# Patient Record
Sex: MALE | Race: OTHER | Employment: FULL TIME | URBAN - METROPOLITAN AREA
[De-identification: names, ages, dates, MRNs, and addresses within clinical notes are randomized per-mention and may not be internally consistent; named-entity substitution may affect disease eponyms.]

---

## 2022-10-24 ENCOUNTER — LAB ENCOUNTER (OUTPATIENT)
Dept: LAB | Facility: HOSPITAL | Age: 52
End: 2022-10-24
Attending: ORTHOPAEDIC SURGERY
Payer: OTHER MISCELLANEOUS

## 2022-10-24 DIAGNOSIS — Z01.818 PREOPERATIVE TESTING: ICD-10-CM

## 2022-10-24 LAB
ANION GAP SERPL CALC-SCNC: 3 MMOL/L (ref 0–18)
ANTIBODY SCREEN: NEGATIVE
BASOPHILS # BLD AUTO: 0.06 X10(3) UL (ref 0–0.2)
BASOPHILS NFR BLD AUTO: 1.1 %
BUN BLD-MCNC: 12 MG/DL (ref 7–18)
BUN/CREAT SERPL: 14.3 (ref 10–20)
CALCIUM BLD-MCNC: 9.1 MG/DL (ref 8.5–10.1)
CHLORIDE SERPL-SCNC: 108 MMOL/L (ref 98–112)
CO2 SERPL-SCNC: 27 MMOL/L (ref 21–32)
CREAT BLD-MCNC: 0.84 MG/DL
DEPRECATED RDW RBC AUTO: 40.9 FL (ref 35.1–46.3)
EOSINOPHIL # BLD AUTO: 0.09 X10(3) UL (ref 0–0.7)
EOSINOPHIL NFR BLD AUTO: 1.6 %
ERYTHROCYTE [DISTWIDTH] IN BLOOD BY AUTOMATED COUNT: 12.4 % (ref 11–15)
GFR SERPLBLD BASED ON 1.73 SQ M-ARVRAT: 105 ML/MIN/1.73M2 (ref 60–?)
GLUCOSE BLD-MCNC: 95 MG/DL (ref 70–99)
HCT VFR BLD AUTO: 43.6 %
HGB BLD-MCNC: 14.6 G/DL
IMM GRANULOCYTES # BLD AUTO: 0.01 X10(3) UL (ref 0–1)
IMM GRANULOCYTES NFR BLD: 0.2 %
LYMPHOCYTES # BLD AUTO: 2.04 X10(3) UL (ref 1–4)
LYMPHOCYTES NFR BLD AUTO: 37.1 %
MCH RBC QN AUTO: 30.1 PG (ref 26–34)
MCHC RBC AUTO-ENTMCNC: 33.5 G/DL (ref 31–37)
MCV RBC AUTO: 89.9 FL
MONOCYTES # BLD AUTO: 0.46 X10(3) UL (ref 0.1–1)
MONOCYTES NFR BLD AUTO: 8.4 %
NEUTROPHILS # BLD AUTO: 2.84 X10 (3) UL (ref 1.5–7.7)
NEUTROPHILS # BLD AUTO: 2.84 X10(3) UL (ref 1.5–7.7)
NEUTROPHILS NFR BLD AUTO: 51.6 %
OSMOLALITY SERPL CALC.SUM OF ELEC: 286 MOSM/KG (ref 275–295)
PLATELET # BLD AUTO: 177 10(3)UL (ref 150–450)
POTASSIUM SERPL-SCNC: 4.3 MMOL/L (ref 3.5–5.1)
RBC # BLD AUTO: 4.85 X10(6)UL
RH BLOOD TYPE: POSITIVE
RH BLOOD TYPE: POSITIVE
SARS-COV-2 RNA RESP QL NAA+PROBE: NOT DETECTED
SODIUM SERPL-SCNC: 138 MMOL/L (ref 136–145)
WBC # BLD AUTO: 5.5 X10(3) UL (ref 4–11)

## 2022-10-24 PROCEDURE — 86900 BLOOD TYPING SEROLOGIC ABO: CPT

## 2022-10-24 PROCEDURE — 86850 RBC ANTIBODY SCREEN: CPT

## 2022-10-24 PROCEDURE — 85025 COMPLETE CBC W/AUTO DIFF WBC: CPT

## 2022-10-24 PROCEDURE — 87641 MR-STAPH DNA AMP PROBE: CPT

## 2022-10-24 PROCEDURE — 36415 COLL VENOUS BLD VENIPUNCTURE: CPT

## 2022-10-24 PROCEDURE — 80048 BASIC METABOLIC PNL TOTAL CA: CPT

## 2022-10-24 PROCEDURE — 86901 BLOOD TYPING SEROLOGIC RH(D): CPT

## 2022-10-25 LAB — MRSA DNA SPEC QL NAA+PROBE: NEGATIVE

## 2022-10-25 RX ORDER — IBUPROFEN 600 MG/1
600 TABLET ORAL EVERY 6 HOURS PRN
COMMUNITY
End: 2022-10-28

## 2022-10-27 ENCOUNTER — HOSPITAL ENCOUNTER (INPATIENT)
Facility: HOSPITAL | Age: 52
LOS: 1 days | Discharge: HOME | End: 2022-10-28
Attending: ORTHOPAEDIC SURGERY | Admitting: ORTHOPAEDIC SURGERY
Payer: OTHER MISCELLANEOUS

## 2022-10-27 ENCOUNTER — ANESTHESIA (OUTPATIENT)
Dept: SURGERY | Facility: HOSPITAL | Age: 52
End: 2022-10-27
Payer: OTHER MISCELLANEOUS

## 2022-10-27 ENCOUNTER — APPOINTMENT (OUTPATIENT)
Dept: GENERAL RADIOLOGY | Facility: HOSPITAL | Age: 52
End: 2022-10-27
Attending: ORTHOPAEDIC SURGERY
Payer: OTHER MISCELLANEOUS

## 2022-10-27 ENCOUNTER — ANESTHESIA EVENT (OUTPATIENT)
Dept: SURGERY | Facility: HOSPITAL | Age: 52
End: 2022-10-27
Payer: OTHER MISCELLANEOUS

## 2022-10-27 DIAGNOSIS — Z01.818 PREOPERATIVE TESTING: Primary | ICD-10-CM

## 2022-10-27 PROCEDURE — 99232 SBSQ HOSP IP/OBS MODERATE 35: CPT | Performed by: HOSPITALIST

## 2022-10-27 PROCEDURE — 76000 FLUOROSCOPY <1 HR PHYS/QHP: CPT | Performed by: ORTHOPAEDIC SURGERY

## 2022-10-27 PROCEDURE — 0SG0071 FUSION OF LUMBAR VERTEBRAL JOINT WITH AUTOLOGOUS TISSUE SUBSTITUTE, POSTERIOR APPROACH, POSTERIOR COLUMN, OPEN APPROACH: ICD-10-PCS | Performed by: ORTHOPAEDIC SURGERY

## 2022-10-27 PROCEDURE — 01NB0ZZ RELEASE LUMBAR NERVE, OPEN APPROACH: ICD-10-PCS | Performed by: ORTHOPAEDIC SURGERY

## 2022-10-27 PROCEDURE — 0SG00AJ FUSION OF LUMBAR VERTEBRAL JOINT WITH INTERBODY FUSION DEVICE, POSTERIOR APPROACH, ANTERIOR COLUMN, OPEN APPROACH: ICD-10-PCS | Performed by: ORTHOPAEDIC SURGERY

## 2022-10-27 PROCEDURE — 4A11X4G MONITORING OF PERIPHERAL NERVOUS ELECTRICAL ACTIVITY, INTRAOPERATIVE, EXTERNAL APPROACH: ICD-10-PCS | Performed by: ORTHOPAEDIC SURGERY

## 2022-10-27 PROCEDURE — 0SB20ZZ EXCISION OF LUMBAR VERTEBRAL DISC, OPEN APPROACH: ICD-10-PCS | Performed by: ORTHOPAEDIC SURGERY

## 2022-10-27 PROCEDURE — 0Q800ZZ DIVISION OF LUMBAR VERTEBRA, OPEN APPROACH: ICD-10-PCS | Performed by: ORTHOPAEDIC SURGERY

## 2022-10-27 DEVICE — ROD, PRE-BENT
Type: IMPLANTABLE DEVICE | Site: BACK | Status: FUNCTIONAL
Brand: STREAMLINE MIS SPINAL FIXATION SYSTEM

## 2022-10-27 DEVICE — SET SCREW
Type: IMPLANTABLE DEVICE | Site: BACK | Status: FUNCTIONAL
Brand: STREAMLINE TL SPINAL FIXATION SYSTEM

## 2022-10-27 DEVICE — GRAFT BONE PROPEL LARGE PUTTY: Type: IMPLANTABLE DEVICE | Site: BACK | Status: FUNCTIONAL

## 2022-10-27 RX ORDER — ROCURONIUM BROMIDE 10 MG/ML
INJECTION, SOLUTION INTRAVENOUS AS NEEDED
Status: DISCONTINUED | OUTPATIENT
Start: 2022-10-27 | End: 2022-10-27 | Stop reason: SURG

## 2022-10-27 RX ORDER — HYDROMORPHONE HYDROCHLORIDE 1 MG/ML
0.4 INJECTION, SOLUTION INTRAMUSCULAR; INTRAVENOUS; SUBCUTANEOUS EVERY 2 HOUR PRN
Status: DISCONTINUED | OUTPATIENT
Start: 2022-10-27 | End: 2022-10-28

## 2022-10-27 RX ORDER — DIPHENHYDRAMINE HCL 25 MG
25 CAPSULE ORAL EVERY 4 HOURS PRN
Status: DISCONTINUED | OUTPATIENT
Start: 2022-10-27 | End: 2022-10-28

## 2022-10-27 RX ORDER — SODIUM CHLORIDE, SODIUM LACTATE, POTASSIUM CHLORIDE, CALCIUM CHLORIDE 600; 310; 30; 20 MG/100ML; MG/100ML; MG/100ML; MG/100ML
INJECTION, SOLUTION INTRAVENOUS CONTINUOUS
Status: DISCONTINUED | OUTPATIENT
Start: 2022-10-27 | End: 2022-10-27 | Stop reason: HOSPADM

## 2022-10-27 RX ORDER — DEXAMETHASONE SODIUM PHOSPHATE 4 MG/ML
VIAL (ML) INJECTION AS NEEDED
Status: DISCONTINUED | OUTPATIENT
Start: 2022-10-27 | End: 2022-10-27 | Stop reason: SURG

## 2022-10-27 RX ORDER — ONDANSETRON 2 MG/ML
INJECTION INTRAMUSCULAR; INTRAVENOUS AS NEEDED
Status: DISCONTINUED | OUTPATIENT
Start: 2022-10-27 | End: 2022-10-27 | Stop reason: SURG

## 2022-10-27 RX ORDER — ONDANSETRON 2 MG/ML
4 INJECTION INTRAMUSCULAR; INTRAVENOUS EVERY 6 HOURS PRN
Status: DISCONTINUED | OUTPATIENT
Start: 2022-10-27 | End: 2022-10-27 | Stop reason: HOSPADM

## 2022-10-27 RX ORDER — EPHEDRINE SULFATE 50 MG/ML
INJECTION, SOLUTION INTRAVENOUS AS NEEDED
Status: DISCONTINUED | OUTPATIENT
Start: 2022-10-27 | End: 2022-10-27 | Stop reason: SURG

## 2022-10-27 RX ORDER — HYDROMORPHONE HYDROCHLORIDE 1 MG/ML
0.6 INJECTION, SOLUTION INTRAMUSCULAR; INTRAVENOUS; SUBCUTANEOUS EVERY 5 MIN PRN
Status: DISCONTINUED | OUTPATIENT
Start: 2022-10-27 | End: 2022-10-27 | Stop reason: HOSPADM

## 2022-10-27 RX ORDER — OXYCODONE HYDROCHLORIDE 5 MG/1
5 TABLET ORAL EVERY 4 HOURS PRN
Status: DISCONTINUED | OUTPATIENT
Start: 2022-10-27 | End: 2022-10-28

## 2022-10-27 RX ORDER — FAMOTIDINE 20 MG/1
20 TABLET, FILM COATED ORAL ONCE
Status: COMPLETED | OUTPATIENT
Start: 2022-10-27 | End: 2022-10-27

## 2022-10-27 RX ORDER — CYCLOBENZAPRINE HCL 5 MG
5 TABLET ORAL EVERY 6 HOURS PRN
Status: DISCONTINUED | OUTPATIENT
Start: 2022-10-27 | End: 2022-10-28

## 2022-10-27 RX ORDER — BISACODYL 10 MG
10 SUPPOSITORY, RECTAL RECTAL
Status: DISCONTINUED | OUTPATIENT
Start: 2022-10-27 | End: 2022-10-28

## 2022-10-27 RX ORDER — MORPHINE SULFATE 4 MG/ML
4 INJECTION, SOLUTION INTRAMUSCULAR; INTRAVENOUS EVERY 10 MIN PRN
Status: DISCONTINUED | OUTPATIENT
Start: 2022-10-27 | End: 2022-10-27 | Stop reason: HOSPADM

## 2022-10-27 RX ORDER — HYDROMORPHONE HYDROCHLORIDE 1 MG/ML
0.8 INJECTION, SOLUTION INTRAMUSCULAR; INTRAVENOUS; SUBCUTANEOUS EVERY 2 HOUR PRN
Status: DISCONTINUED | OUTPATIENT
Start: 2022-10-27 | End: 2022-10-28

## 2022-10-27 RX ORDER — CEFAZOLIN SODIUM/WATER 2 G/20 ML
2 SYRINGE (ML) INTRAVENOUS ONCE
Status: COMPLETED | OUTPATIENT
Start: 2022-10-27 | End: 2022-10-27

## 2022-10-27 RX ORDER — ACETAMINOPHEN 500 MG
1000 TABLET ORAL ONCE
Status: COMPLETED | OUTPATIENT
Start: 2022-10-27 | End: 2022-10-27

## 2022-10-27 RX ORDER — PROCHLORPERAZINE EDISYLATE 5 MG/ML
5 INJECTION INTRAMUSCULAR; INTRAVENOUS EVERY 8 HOURS PRN
Status: DISCONTINUED | OUTPATIENT
Start: 2022-10-27 | End: 2022-10-27 | Stop reason: HOSPADM

## 2022-10-27 RX ORDER — DIAZEPAM 5 MG/1
5 TABLET ORAL EVERY 6 HOURS PRN
Status: DISCONTINUED | OUTPATIENT
Start: 2022-10-27 | End: 2022-10-28

## 2022-10-27 RX ORDER — POLYETHYLENE GLYCOL 3350 17 G/17G
17 POWDER, FOR SOLUTION ORAL DAILY PRN
Status: DISCONTINUED | OUTPATIENT
Start: 2022-10-27 | End: 2022-10-28

## 2022-10-27 RX ORDER — MORPHINE SULFATE 10 MG/ML
6 INJECTION, SOLUTION INTRAMUSCULAR; INTRAVENOUS EVERY 10 MIN PRN
Status: DISCONTINUED | OUTPATIENT
Start: 2022-10-27 | End: 2022-10-27 | Stop reason: HOSPADM

## 2022-10-27 RX ORDER — SODIUM CHLORIDE 9 MG/ML
INJECTION, SOLUTION INTRAVENOUS CONTINUOUS PRN
Status: DISCONTINUED | OUTPATIENT
Start: 2022-10-27 | End: 2022-10-27 | Stop reason: SURG

## 2022-10-27 RX ORDER — SODIUM PHOSPHATE, DIBASIC AND SODIUM PHOSPHATE, MONOBASIC 7; 19 G/133ML; G/133ML
1 ENEMA RECTAL ONCE AS NEEDED
Status: DISCONTINUED | OUTPATIENT
Start: 2022-10-27 | End: 2022-10-28

## 2022-10-27 RX ORDER — SENNOSIDES 8.6 MG
17.2 TABLET ORAL NIGHTLY
Status: DISCONTINUED | OUTPATIENT
Start: 2022-10-27 | End: 2022-10-28

## 2022-10-27 RX ORDER — MORPHINE SULFATE 4 MG/ML
2 INJECTION, SOLUTION INTRAMUSCULAR; INTRAVENOUS EVERY 10 MIN PRN
Status: DISCONTINUED | OUTPATIENT
Start: 2022-10-27 | End: 2022-10-27 | Stop reason: HOSPADM

## 2022-10-27 RX ORDER — DOCUSATE SODIUM 100 MG/1
100 CAPSULE, LIQUID FILLED ORAL 2 TIMES DAILY
Status: DISCONTINUED | OUTPATIENT
Start: 2022-10-27 | End: 2022-10-28

## 2022-10-27 RX ORDER — ONDANSETRON 2 MG/ML
4 INJECTION INTRAMUSCULAR; INTRAVENOUS EVERY 6 HOURS PRN
Status: DISCONTINUED | OUTPATIENT
Start: 2022-10-27 | End: 2022-10-28

## 2022-10-27 RX ORDER — MIDAZOLAM HYDROCHLORIDE 1 MG/ML
INJECTION INTRAMUSCULAR; INTRAVENOUS AS NEEDED
Status: DISCONTINUED | OUTPATIENT
Start: 2022-10-27 | End: 2022-10-27 | Stop reason: SURG

## 2022-10-27 RX ORDER — PROCHLORPERAZINE EDISYLATE 5 MG/ML
5 INJECTION INTRAMUSCULAR; INTRAVENOUS EVERY 8 HOURS PRN
Status: DISCONTINUED | OUTPATIENT
Start: 2022-10-27 | End: 2022-10-28

## 2022-10-27 RX ORDER — METOCLOPRAMIDE 10 MG/1
10 TABLET ORAL ONCE
Status: COMPLETED | OUTPATIENT
Start: 2022-10-27 | End: 2022-10-27

## 2022-10-27 RX ORDER — HYDROMORPHONE HYDROCHLORIDE 1 MG/ML
0.2 INJECTION, SOLUTION INTRAMUSCULAR; INTRAVENOUS; SUBCUTANEOUS EVERY 5 MIN PRN
Status: DISCONTINUED | OUTPATIENT
Start: 2022-10-27 | End: 2022-10-27 | Stop reason: HOSPADM

## 2022-10-27 RX ORDER — DIPHENHYDRAMINE HYDROCHLORIDE 50 MG/ML
25 INJECTION INTRAMUSCULAR; INTRAVENOUS EVERY 4 HOURS PRN
Status: DISCONTINUED | OUTPATIENT
Start: 2022-10-27 | End: 2022-10-28

## 2022-10-27 RX ORDER — SODIUM CHLORIDE, SODIUM LACTATE, POTASSIUM CHLORIDE, CALCIUM CHLORIDE 600; 310; 30; 20 MG/100ML; MG/100ML; MG/100ML; MG/100ML
INJECTION, SOLUTION INTRAVENOUS CONTINUOUS
Status: DISCONTINUED | OUTPATIENT
Start: 2022-10-27 | End: 2022-10-28

## 2022-10-27 RX ORDER — LIDOCAINE HYDROCHLORIDE 10 MG/ML
INJECTION, SOLUTION EPIDURAL; INFILTRATION; INTRACAUDAL; PERINEURAL AS NEEDED
Status: DISCONTINUED | OUTPATIENT
Start: 2022-10-27 | End: 2022-10-27 | Stop reason: SURG

## 2022-10-27 RX ORDER — OXYCODONE HYDROCHLORIDE 5 MG/1
10 TABLET ORAL EVERY 4 HOURS PRN
Status: DISCONTINUED | OUTPATIENT
Start: 2022-10-27 | End: 2022-10-28

## 2022-10-27 RX ORDER — NALOXONE HYDROCHLORIDE 0.4 MG/ML
80 INJECTION, SOLUTION INTRAMUSCULAR; INTRAVENOUS; SUBCUTANEOUS AS NEEDED
Status: DISCONTINUED | OUTPATIENT
Start: 2022-10-27 | End: 2022-10-27 | Stop reason: HOSPADM

## 2022-10-27 RX ORDER — VANCOMYCIN HYDROCHLORIDE 500 MG/10ML
INJECTION, POWDER, LYOPHILIZED, FOR SOLUTION INTRAVENOUS CONTINUOUS PRN
Status: COMPLETED | OUTPATIENT
Start: 2022-10-27 | End: 2022-10-27

## 2022-10-27 RX ORDER — CEFAZOLIN SODIUM/WATER 2 G/20 ML
2 SYRINGE (ML) INTRAVENOUS EVERY 8 HOURS
Status: COMPLETED | OUTPATIENT
Start: 2022-10-27 | End: 2022-10-28

## 2022-10-27 RX ORDER — ESMOLOL HYDROCHLORIDE 10 MG/ML
INJECTION INTRAVENOUS AS NEEDED
Status: DISCONTINUED | OUTPATIENT
Start: 2022-10-27 | End: 2022-10-27 | Stop reason: SURG

## 2022-10-27 RX ORDER — GLYCOPYRROLATE 0.2 MG/ML
INJECTION, SOLUTION INTRAMUSCULAR; INTRAVENOUS AS NEEDED
Status: DISCONTINUED | OUTPATIENT
Start: 2022-10-27 | End: 2022-10-27 | Stop reason: SURG

## 2022-10-27 RX ORDER — HYDROMORPHONE HYDROCHLORIDE 1 MG/ML
0.4 INJECTION, SOLUTION INTRAMUSCULAR; INTRAVENOUS; SUBCUTANEOUS EVERY 5 MIN PRN
Status: DISCONTINUED | OUTPATIENT
Start: 2022-10-27 | End: 2022-10-27 | Stop reason: HOSPADM

## 2022-10-27 RX ADMIN — DEXAMETHASONE SODIUM PHOSPHATE 4 MG: 4 MG/ML VIAL (ML) INJECTION at 08:16:00

## 2022-10-27 RX ADMIN — LIDOCAINE HYDROCHLORIDE 50 MG: 10 INJECTION, SOLUTION EPIDURAL; INFILTRATION; INTRACAUDAL; PERINEURAL at 07:50:00

## 2022-10-27 RX ADMIN — SODIUM CHLORIDE: 9 INJECTION, SOLUTION INTRAVENOUS at 08:05:00

## 2022-10-27 RX ADMIN — MIDAZOLAM HYDROCHLORIDE 2 MG: 1 INJECTION INTRAMUSCULAR; INTRAVENOUS at 07:39:00

## 2022-10-27 RX ADMIN — EPHEDRINE SULFATE 5 MG: 50 INJECTION, SOLUTION INTRAVENOUS at 07:53:00

## 2022-10-27 RX ADMIN — CEFAZOLIN SODIUM/WATER 2 G: 2 G/20 ML SYRINGE (ML) INTRAVENOUS at 07:55:00

## 2022-10-27 RX ADMIN — SODIUM CHLORIDE, SODIUM LACTATE, POTASSIUM CHLORIDE, CALCIUM CHLORIDE: 600; 310; 30; 20 INJECTION, SOLUTION INTRAVENOUS at 09:47:00

## 2022-10-27 RX ADMIN — ROCURONIUM BROMIDE 10 MG: 10 INJECTION, SOLUTION INTRAVENOUS at 07:50:00

## 2022-10-27 RX ADMIN — SODIUM CHLORIDE, SODIUM LACTATE, POTASSIUM CHLORIDE, CALCIUM CHLORIDE: 600; 310; 30; 20 INJECTION, SOLUTION INTRAVENOUS at 07:39:00

## 2022-10-27 RX ADMIN — SODIUM CHLORIDE: 9 INJECTION, SOLUTION INTRAVENOUS at 09:47:00

## 2022-10-27 RX ADMIN — GLYCOPYRROLATE 0.2 MG: 0.2 INJECTION, SOLUTION INTRAMUSCULAR; INTRAVENOUS at 09:23:00

## 2022-10-27 RX ADMIN — ONDANSETRON 4 MG: 2 INJECTION INTRAMUSCULAR; INTRAVENOUS at 09:30:00

## 2022-10-27 RX ADMIN — ESMOLOL HYDROCHLORIDE 20 MG: 10 INJECTION INTRAVENOUS at 09:57:00

## 2022-10-27 RX ADMIN — EPHEDRINE SULFATE 10 MG: 50 INJECTION, SOLUTION INTRAVENOUS at 09:13:00

## 2022-10-27 NOTE — ANESTHESIA PROCEDURE NOTES
Arterial Line    Date/Time: 10/27/2022 7:46 AM  Performed by: Jayden Bowen CRNA  Authorized by: Jack Garza DO     General Information and Staff    Procedure Start:  10/27/2022 7:46 AM  Procedure End:  10/27/2022 7:48 AM  CRNA:  Jayden Bowen CRNA  Performed By:  CRNA  Patient Location:  OR  Indication: continuous blood pressure monitoring    Site Identification: surface landmarks    Preanesthetic Checklist: 2 patient identifiers, IV checked, risks and benefits discussed, monitors and equipment checked, pre-op evaluation, timeout performed, anesthesia consent and sterile technique used    Procedure Details    Catheter Size:  20 G  Catheter Length:  1 and 3/4 inch  Catheter Type:  Arrow  Seldinger Technique?: Yes    Laterality:  Right  Site:  Radial artery  Site Prep: chlorhexidine    Line Secured:  Tape and Tegaderm    Assessment    Events: patient tolerated procedure well with no complications      Medications  10/27/2022 7:46 AM      Additional Comments    Atraumatic x 1 attempt as noted

## 2022-10-27 NOTE — INTERVAL H&P NOTE
Pre-op Diagnosis: Lumbar spinal stenosis, lumbar degenerative disk disease, lumbar radiculitis, lumbar spine instability    The above referenced H&P was reviewed by Hakan Bright MD on 10/27/2022, the patient was examined and no significant changes have occurred in the patient's condition since the H&P was performed. I discussed with the patient and/or legal representative the potential benefits, risks and side effects of this procedure; the likelihood of the patient achieving goals; and potential problems that might occur during recuperation. I discussed reasonable alternatives to the procedure, including risks, benefits and side effects related to the alternatives and risks related to not receiving this procedure. We will proceed with procedure as planned. Patient understands the risks, complications, and alternatives to procedure. Risks discussed included bleeding, infection, scarring, injury to nerves and blood vessels, CSF leakage, pseudoarthrosis, failure of instrumentation, continued and/or worsening pain in the lumbar spine and/or lower extremities, continued and/or worsening numbness, tingling, weakness in the lower extremities, paraparesis, paraplegia, bowel and bladder dysfunction, sexual dysfunction, adjacent segment disease, vision loss as well as the general risks of anesthetics and surgical procedures in general, namely cardiac, pulmonary, embolic, stroke, as well as the remote possibility of death. Patient understands that no promises and/or guarantees with regards to the performance of any surgical procedure can or has been given. Patient understands that desired results may not be achieved and all of their questions were answered. Pt wishes to pursue accepted procedure.

## 2022-10-27 NOTE — BRIEF OP NOTE
Pre-Operative Diagnosis: Lumbar spinal stenosis, lumbar degenerative disk disease, lumbar radiculitis, lumbar spine instability     Post-Operative Diagnosis: Lumbar spinal stenosis, lumbar degenerative disk disease, lumbar radiculitis, lumbar spine instability      Procedure Performed:   L4-5 posterior laminectomy  spinal fusion, transforaminal lumbar interbody fusion, instrumentation, allograft, autograft    Surgeon(s) and Role:     David Young MD - Primary     * Estrella Medrano MD - Assisting Surgeon    Assistant(s):  Surgical Assistant.: Eleazar Welsh     Surgical Findings: spondylolisthesis, stenosis     Specimen: disk     Estimated Blood Loss: Blood Output: 50 mL (10/27/2022  9:39 AM)  IVF 800cc    SSEP/EMG without changes during decompression/fusion.    All screws >10mA      Dictation Number:    Hakan Bright MD  10/27/2022  9:50 AM

## 2022-10-27 NOTE — OPERATIVE REPORT
[facility]    PATIENT'S NAME: Jossue Perrin   ATTENDING PHYSICIAN: Jatin Jhaveri MD   OPERATING PHYSICIAN: Jatin Jhaveri MD   PATIENT ACCOUNT#:   [de-identified]    LOCATION:  [address] [place] [address] [place] #:   [id number]       YOB: 1970  ADMISSION DATE:       10/27/2022      OPERATION DATE:  10/27/2022    OPERATIVE REPORT    PREOPERATIVE DIAGNOSIS:  L4-5 spondylolisthesis, lateral recess stenosis, radiculopathy. POSTOPERATIVE DIAGNOSIS:  L4-5 spondylolisthesis, lateral recess stenosis, radiculopathy. PROCEDURE:    1. Decompressive laminectomy for lateral recess stenosis, L4-5.  2.   Boggs-Gonzalez osteotomy, L4-5, removal of facet. 3.   Posterior spinal fusion, intertransverse process arthrodesis L4-5.  4.   Transforaminal lumbar interbody fusion, L4-5.  5. Instrumentation for posterior spinal fusion, intertransverse process arthrodesis, L4-5 using Surgalign MIS percutaneous pedicle screws and rods. 6.   Instrumentation for transforaminal lumbar interbody fusion, L4-5 using Surgalign interbody cage. 7.   Aspiration of bone marrow, L4 and L5 vertebral bodies. 8.   Usage of morselized autograft through same fascial incision. 9.   Usage of demineralized bone matrix allograft putty. 10.   Intraoperative usage of C-arm fluoroscopy image intensification. 11.   Intraoperative usage of SSEP/EMG monitoring with pedicle screw stimulation. CO-SURGEONS:  Jatin Jhaveri M.D. and Richie Guerra M.D.     [facility]:  Maribel Jameson [facility]. ANESTHESIA:  General anesthesia. ESTIMATED BLOOD LOSS:  50 mL. INTRAVENOUS FLUIDS:  800 mL of crystalloid. SPECIMENS:  L4-5 disc. COMPLICATIONS:  None. SSEP/EMG is without changes throughout the entirety of decompression and fusion with instrumentation. All pedicle screws stimulated with all results greater than 10 milliamps.     INDICATIONS:  The patient is a 69-year-old male who sustained a work-related injury to his lumbar spine on 01/24/2022. He had radiculopathy primarily down the right leg, but also some in his left buttock and thigh. He was worked up with an MRI scan that showed spondylolisthesis and lateral recess stenosis. He did undergo extensive conservative management including medications, physical therapy, injections. He did elect for not only decompression but also stabilization with instrumentation due to his spondylolisthesis. He did understand the risks, complications, and alternatives to the procedure. Risks discussed included bleeding, infection, scarring, injury to nerves and blood vessels, discitis, CSF leakage, pseudoarthrosis, failure of instrumentation, continued and/or worsening pain in the lumbar spine and/or lower extremities, continued and/or worsening numbness, weakness in the extremities, paraparesis, paraplegia, bowel and bladder dysfunction, sexual dysfunction, vision loss, as well as the general risks of anesthetics and surgical procedures in general, namely cardiac, pulmonary, embolic, stroke, ileus, as well as remote possibility of death. He understood that no promises and/or guarantees with regard to the performance of any surgical procedure can or has been given. He understands that desired results may not be achieved, and all of his questions were answered. He was deemed medically stable by the primary care physician as well as the cardiology team prior to surgery. OPERATIVE TECHNIQUE:  The patient was brought to the operating room, was given preoperative IV antibiotics, and given general anesthesia without any difficulty. He had SCDs as well as SSEP/EMG monitoring applied. He was transferred to the prone position on the operating room table and padded appropriately. The lower lumbar spine was then prepped and draped in the usual sterile fashion. Bilateral paraspinal vertical incisions were made extending from the L4 pedicle down to the L5 pedicle.   Anne Suresh retractors were used in the subcutaneous tissue. Bilateral fascial incisions were made in line with the skin incision. The first portion of the procedure was percutaneous pedicle screw insertion on the left at L4-5. The Jamshidi needles were used to enter the posterior pedicles of L4 and L5 on the left. The guidewire was then placed through the Jamshidi needles into the vertebral bodies of L4 and L5. This was visualized on both AP and lateral image intensification views. At this point, bone marrow aspirate was then taken with an empty syringe through the Jamshidi needles. This was performed at both L4 and L5 on the left. This bone marrow aspirate was then placed on the side and mixed with the bone graft for fusion later in the case. The Jamshidi needles were then removed at L4 and L5, leaving the guidewires in place. A 6.5 mm tap was then used to tap the pedicles of L4 and L5 on the left. The guidewire was then used to ensure that there was no medial cortical penetration. A 7.5 x 45 mm Surgalign pedicle screw was then inserted over the guidewire into the pedicle of L4 on the left. A 7.5 x 40 mm Surgalign pedicle screw was then inserted over the guidewire into the pedicle of L5 on the left. The guidewires were then removed. There were no SSEP or EMG changes noted during percutaneous pedicle screw placement. The left L4 and L5 screws were then stimulated with all results greater than 10 milliamps. Next, the right-sided pedicles of L4 and L5 were then addressed. The Jamshidi needles were used in order to penetrate the posterior pedicles of L4 and L5 on the right. This was visualized in both AP and lateral image intensification views. After insertion of the Jamshidi needles, the guidewires were then fed through the sheaths. The Jamshidi needles were then removed. A 6.5 mm tap was then used to tap the pedicles of L4 and L5 on the right.   There were no SSEP or EMG changes noted during tapping of the pedicles. The guidewires were then used to ensure that there was no medial cortical penetration. This was performed under AP and lateral image intensification views. The guidewires were then placed to the side and held with a hemostat forceps for usage later in the case. Next, decompressive laminectomy and Boggs-Gonzalez osteotomy was then performed. It should be noted that Dr. Celina Drummond and I acted as co-surgeons throughout the entirety of decompression and stabilization with instrumentation. Praful Constantino [facility], was first assistant. He assisted in instrument placement, visualization of the nerve root, morselization of the bone graft, and decreased the patient's operative time. His assistance was a medical necessity. Self-retaining retractors were then placed in the wounds. The L4-5 facet on the right was then in direct visualization. The pars interarticularis was then identified. A small osteotome was then used to take down the pars interarticularis on the right. The facet at L4-5 on the right was taken down using Kerrison rongeurs. This was performed in Smith-Gonzalez osteotomy fashion. The facet was removed. This autograft was then morselized by [facility], and mixed with demineralized bone matrix allograft putty as well as the bone marrow aspirate. This bone graft was then placed on the side for spinal fusion later in the case. There was significant lateral recess stenosis due to thickening of the ligament as well as medial facet hypertrophy. The thickened ligament was taken down using Kerrison rongeurs. Also, complete facetectomy was performed on the right in Smith-Gonzalez osteotomy fashion to adequately decompress the nerve root and provide access for insertion of the interbody cage. Emry Blocker was then used to explore the lateral recess. Excellent decompression of the L4 and L5 nerve roots was noted after decompression and removal of the facet.   There were no SSEP or EMG changes noted during decompression. Next, discectomy and interbody fusion with instrumentation was performed. At L4-5, the L5 nerve root was gently retracted toward midline. A #15 blade was then used to perform an annulotomy. Pituitary rongeurs were then used to remove the disc material.  Shaving was then progressed from a 7 mm shaver up to 12 mm shaver. A 12 x 26 mm Surgalign interbody cage was then packed with the bone graft and impacted in the L4-5 interspace in transforaminal lumbar interbody fashion from the right. Image intensification views looked very good. There were no SSEP or EMG changes noted. Next, pedicle screw instrumentation for posterolateral fusion was then performed on the right. A 7.5 x 45 mm pedicle screw was then placed over the L4 guidewire. The screw was then inserted without any difficulty. A 7.5 x 40 mm pedicle screw was then placed over the right L5 guidewire. The screw was then inserted without any difficulty. Image intensification views looked very good. There were no SSEP or EMG changes noted. At this point, the pedicle screws of L4 and L5 on the right were stimulated with all results greater than 10 milliamps. Next, decortication of the transverse processes of L4 and L5 was then performed using a double-action rongeur. Two 40 mm rods were then placed in the polyaxial heads of the pedicle screws. The L4 set screws were then tightened and torqued. Slight compression was then carried out across L4-5 with sequential tightening and torquing of the L5 set screw. This was performed bilaterally. There were no SSEP or EMG changes noted during compression. Final intraoperative AP and lateral image intensification view showed appropriate pedicle screw placement, cage placement, and good spinal alignment. Next, copious irrigation using 0.9 normal saline was then used to irrigate out the wounds.   There was no evidence of any CSF leakage after multiple Valsalva maneuvers. The remainder of the combination allograft and autograft was then impacted in the intertransverse process gutters bilaterally for posterolateral fusion across L4-5 after decortication. The fascia was then reapproximated in the usual fashion using an Ethibond suture. The skin also was closed in the usual fashion, first layer subcutaneous and then the skin. Xeroform dressings, 4x4's and Tegaderm were then applied to the wounds. The drapes were then removed and the patient was then transferred to the supine position on the cart. He was extubated without any complications, moving all 4 extremities at the end of the case, and transferred to recovery room in stable condition. All sponge, needle, and instrument counts were appropriate and correct.     Dictated By Anand Golden MD  d: 10/27/2022 10:59:47  t: 10/27/2022 11:45:32  Job [id number]  KMK/    cc: Anand Golden MD

## 2022-10-27 NOTE — OPERATIVE REPORT
[facility]    PATIENT'S NAME: Ananth Espinal Cap   ATTENDING PHYSICIAN: Perico Ruiz MD   OPERATING PHYSICIAN: Perico Ruiz MD   PATIENT ACCOUNT#:   [de-identified]    LOCATION:  [address] [place], [place] [address]jonathan CastilloOlyphant #:   [id number]       YOB: 1970  ADMISSION DATE:       10/27/2022      OPERATION DATE:  10/27/2022    OPERATIVE REPORT    PREOPERATIVE DIAGNOSIS:  L4-5 spondylolisthesis, lateral recess stenosis, radiculopathy. POSTOPERATIVE DIAGNOSIS:  L4-5 spondylolisthesis, lateral recess stenosis, radiculopathy. PROCEDURE:    1. Decompressive laminectomy for lateral recess stenosis, L4-5.  2.   Boggs-Gonzalez osteotomy, L4-5, removal of facet. 3.   Posterior spinal fusion, intertransverse process arthrodesis L4-5.  4.   Transforaminal lumbar interbody fusion, L4-5.  5. Instrumentation for posterior spinal fusion, intertransverse process arthrodesis, L4-5 using Surgalign MIS percutaneous pedicle screws and rods. 6.   Instrumentation for transforaminal lumbar interbody fusion, L4-5 using Surgalign interbody cage. 7.   Aspiration of bone marrow, L4 and L5 vertebral bodies. 8.   Usage of morselized autograft through same fascial incision. 9.   Usage of demineralized bone matrix allograft putty. 10.   Intraoperative usage of C-arm fluoroscopy image intensification. 11.   Intraoperative usage of SSEP/EMG monitoring with pedicle screw stimulation. CO-SURGEONS:  Perico Ruiz M.D. and Hemant Kerr M.D.     Recardo Liner:  MARY ANN Sam. ANESTHESIA:  General anesthesia. ESTIMATED BLOOD LOSS:  50 mL. INTRAVENOUS FLUIDS:  800 mL of crystalloid. SPECIMENS:  L4-5 disc. COMPLICATIONS:  None. SSEP/EMG is without changes throughout the entirety of decompression and fusion with instrumentation. All pedicle screws stimulated with all results greater than 10 milliamps.     INDICATIONS:  The patient is a 48-year-old male who sustained a work-related injury to his lumbar spine on 01/24/2022. He had radiculopathy primarily down the right leg, but also some in his left buttock and thigh. He was worked up with an MRI scan that showed spondylolisthesis and lateral recess stenosis. He did undergo extensive conservative management including medications, physical therapy, injections. He did elect for not only decompression but also stabilization with instrumentation due to his spondylolisthesis. He did understand the risks, complications, and alternatives to the procedure. Risks discussed included bleeding, infection, scarring, injury to nerves and blood vessels, discitis, CSF leakage, pseudoarthrosis, failure of instrumentation, continued and/or worsening pain in the lumbar spine and/or lower extremities, continued and/or worsening numbness, weakness in the extremities, paraparesis, paraplegia, bowel and bladder dysfunction, sexual dysfunction, vision loss, as well as the general risks of anesthetics and surgical procedures in general, namely cardiac, pulmonary, embolic, stroke, ileus, as well as remote possibility of death. He understood that no promises and/or guarantees with regard to the performance of any surgical procedure can or has been given. He understands that desired results may not be achieved, and all of his questions were answered. He was deemed medically stable by the primary care physician as well as the cardiology team prior to surgery. OPERATIVE TECHNIQUE:  The patient was brought to the operating room, was given preoperative IV antibiotics, and given general anesthesia without any difficulty. He had SCDs as well as SSEP/EMG monitoring applied. He was transferred to the prone position on the operating room table and padded appropriately. The lower lumbar spine was then prepped and draped in the usual sterile fashion. Bilateral paraspinal vertical incisions were made extending from the L4 pedicle down to the L5 pedicle.   Vena Husbands retractors were used in the subcutaneous tissue. Bilateral fascial incisions were made in line with the skin incision. The first portion of the procedure was percutaneous pedicle screw insertion on the left at L4-5. The Jamshidi needles were used to enter the posterior pedicles of L4 and L5 on the left. The guidewire was then placed through the Jamshidi needles into the vertebral bodies of L4 and L5. This was visualized on both AP and lateral image intensification views. At this point, bone marrow aspirate was then taken with an empty syringe through the Jamshidi needles. This was performed at both L4 and L5 on the left. This bone marrow aspirate was then placed on the side and mixed with the bone graft for fusion later in the case. The Jamshidi needles were then removed at L4 and L5, leaving the guidewires in place. A 6.5 mm tap was then used to tap the pedicles of L4 and L5 on the left. The guidewire was then used to ensure that there was no medial cortical penetration. A 7.5 x 45 mm Surgalign pedicle screw was then inserted over the guidewire into the pedicle of L4 on the left. A 7.5 x 40 mm Surgalign pedicle screw was then inserted over the guidewire into the pedicle of L5 on the left. The guidewires were then removed. There were no SSEP or EMG changes noted during percutaneous pedicle screw placement. The left L4 and L5 screws were then stimulated with all results greater than 10 milliamps. Next, the right-sided pedicles of L4 and L5 were then addressed. The Jamshidi needles were used in order to penetrate the posterior pedicles of L4 and L5 on the right. This was visualized in both AP and lateral image intensification views. After insertion of the Jamshidi needles, the guidewires were then fed through the sheaths. The Jamshidi needles were then removed. A 6.5 mm tap was then used to tap the pedicles of L4 and L5 on the right.   There were no SSEP or EMG changes noted during tapping of the pedicles. The guidewires were then used to ensure that there was no medial cortical penetration. This was performed under AP and lateral image intensification views. The guidewires were then placed to the side and held with a hemostat forceps for usage later in the case. Next, decompressive laminectomy and Boggs-Gonzalez osteotomy was then performed. It should be noted that Dr. Chelsi Bray and MAUREEN acted as co-surgeons throughout the entirety of decompression and stabilization with instrumentation. José Miguel Sewell [facility], was first assistant. He assisted in instrument placement, visualization of the nerve root, morselization of the bone graft, and decreased the patient's operative time. His assistance was a medical necessity. Self-retaining retractors were then placed in the wounds. The L4-5 facet on the right was then in direct visualization. The pars interarticularis was then identified. A small osteotome was then used to take down the pars interarticularis on the right. The facet at L4-5 on the right was taken down using Kerrison rongeurs. This was performed in Smith-Gonzalez osteotomy fashion. The facet was removed. This autograft was then morselized by [facility], and mixed with demineralized bone matrix allograft putty as well as the bone marrow aspirate. This bone graft was then placed on the side for spinal fusion later in the case. There was significant lateral recess stenosis due to thickening of the ligament as well as medial facet hypertrophy. The thickened ligament was taken down using Kerrison rongeurs. Also, complete facetectomy was performed on the right in Smith-Gonzalez osteotomy fashion to adequately decompress the nerve root and provide access for insertion of the interbody cage. Charlie Hernandez was then used to explore the lateral recess. Excellent decompression of the L4 and L5 nerve roots was noted after decompression and removal of the facet.   There were no SSEP or EMG changes noted during decompression. Next, discectomy and interbody fusion with instrumentation was performed. At L4-5, the L5 nerve root was gently retracted toward midline. A #15 blade was then used to perform an annulotomy. Pituitary rongeurs were then used to remove the disc material.  Shaving was then progressed from a 7 mm shaver up to 12 mm shaver. A 12 x 26 mm Surgalign interbody cage was then packed with the bone graft and impacted in the L4-5 interspace in transforaminal lumbar interbody fashion from the right. Image intensification views looked very good. There were no SSEP or EMG changes noted. Next, pedicle screw instrumentation for posterolateral fusion was then performed on the right. A 7.5 x 45 mm pedicle screw was then placed over the L4 guidewire. The screw was then inserted without any difficulty. A 7.5 x 40 mm pedicle screw was then placed over the right L5 guidewire. The screw was then inserted without any difficulty. Image intensification views looked very good. There were no SSEP or EMG changes noted. At this point, the pedicle screws of L4 and L5 on the right were stimulated with all results greater than 10 milliamps. Next, decortication of the transverse processes of L4 and L5 was then performed using a double-action rongeur. Two 40 mm rods were then placed in the polyaxial heads of the pedicle screws. The L4 set screws were then tightened and torqued. Slight compression was then carried out across L4-5 with sequential tightening and torquing of the L5 set screw. This was performed bilaterally. There were no SSEP or EMG changes noted during compression. Final intraoperative AP and lateral image intensification view showed appropriate pedicle screw placement, cage placement, and good spinal alignment. Next, copious irrigation using 0.9 normal saline was then used to irrigate out the wounds.   There was no evidence of any CSF leakage after multiple Valsalva maneuvers. The remainder of the combination allograft and autograft was then impacted in the intertransverse process gutters bilaterally for posterolateral fusion across L4-5 after decortication. The fascia was then reapproximated in the usual fashion using an Ethibond suture. The skin also was closed in the usual fashion, first layer subcutaneous and then the skin. Xeroform dressings, 4x4's and Tegaderm were then applied to the wounds. The drapes were then removed and the patient was then transferred to the supine position on the cart. He was extubated without any complications, moving all 4 extremities at the end of the case, and transferred to recovery room in stable condition. All sponge, needle, and instrument counts were appropriate and correct.     Dictated By Monico Oconnor MD  d: 10/27/2022 10:59:47  t: 10/27/2022 11:45:32  [facility] [id number]  KMK/    cc: Monico Oconnor MD

## 2022-10-27 NOTE — ANESTHESIA PROCEDURE NOTES
Peripheral IV  Date/Time: 10/27/2022 7:55 AM  Inserted by: Rachelle Costa DO    Placement  Needle size: 18 G  Laterality: right  Location: hand  Local anesthetic: none  Site prep: alcohol  Technique: anatomical landmarks  Attempts: 1

## 2022-10-27 NOTE — ANESTHESIA PROCEDURE NOTES
Airway  Date/Time: 10/27/2022 7:53 AM  Airway not difficult    General Information and Staff    Anesthesiologist: Annie Busch DO  Resident/CRNA: Beryle Merle., CRNA  Performed: CRNA     Indications and Patient Condition  Indications for airway management: anesthesia  Spontaneous Ventilation: absent  Sedation level: deep  Preoxygenated: yes  Patient position: sniffing  MILS maintained throughout  Mask difficulty assessment: 0 - not attempted    Final Airway Details  Final airway type: endotracheal airway      Successful airway: ETT  Cuffed: yes   Successful intubation technique: Video laryngoscopy  Facilitating devices/methods: intubating stylet and cricoid pressure  Endotracheal tube insertion site: oral  Blade: GlideScope  Blade size: #4  ETT size (mm): 7.5    Cormack-Lehane Classification: grade I - full view of glottis  Placement verified by: chest auscultation and capnometry   Cuff volume (mL): 8  Measured from: teeth  ETT to teeth (cm): 22  Number of attempts at approach: 1  Number of other approaches attempted: 0    Additional Comments  Smooth IV induction post arterial line insertion and atraumatic lubricated glidescope intubation x 1 attempt as noted. Dentition and mucosa as pre-op.

## 2022-10-27 NOTE — INTERVAL H&P NOTE
Pre-op Diagnosis: Lumbar spinal stenosis, lumbar degenerative disk disease, lumbar radiculitis, lumbar spine instability    The above referenced H&P was reviewed by Garfield Lundborg, MD on 10/27/2022, the patient was examined and no significant changes have occurred in the patient's condition since the H&P was performed. I discussed with the patient and/or legal representative the potential benefits, risks and side effects of this procedure; the likelihood of the patient achieving goals; and potential problems that might occur during recuperation. I discussed reasonable alternatives to the procedure, including risks, benefits and side effects related to the alternatives and risks related to not receiving this procedure. We will proceed with procedure as planned. Patient understands the risks, complications, and alternatives to procedure. Risks discussed included bleeding, infection, scarring, injury to nerves and blood vessels, CSF leakage, pseudoarthrosis, failure of instrumentation, continued and/or worsening pain in the lumbar spine and/or lower extremities, continued and/or worsening numbness, tingling, weakness in the lower extremities, paraparesis, paraplegia, bowel and bladder dysfunction, sexual dysfunction, adjacent segment disease, vision loss as well as the general risks of anesthetics and surgical procedures in general, namely cardiac, pulmonary, embolic, stroke, as well as the remote possibility of death. Patient understands that no promises and/or guarantees with regards to the performance of any surgical procedure can or has been given. Patient understands that desired results may not be achieved and all of their questions were answered. Pt wishes to pursue accepted procedure.

## 2022-10-27 NOTE — OPERATIVE REPORT
[facility]    PATIENT'S NAME: Pearl Jaimes   ATTENDING PHYSICIAN: Dinesh Carrero MD   OPERATING PHYSICIAN: Dinesh Carrero MD   PATIENT ACCOUNT#:   [de-identified]    LOCATION:  [address] [place] [address] Tiana Pérez #:   [id number]       YOB: 1970  ADMISSION DATE:       10/27/2022      OPERATION DATE:  10/27/2022    OPERATIVE REPORT    PREOPERATIVE DIAGNOSIS:  L4-5 spondylolisthesis, lateral recess stenosis, radiculopathy. POSTOPERATIVE DIAGNOSIS:  L4-5 spondylolisthesis, lateral recess stenosis, radiculopathy. PROCEDURE:    1. Decompressive laminectomy for lateral recess stenosis, L4-5.  2.   Boggs-Gonzalez osteotomy, L4-5, removal of facet. 3.   Posterior spinal fusion, intertransverse process arthrodesis L4-5.  4.   Transforaminal lumbar interbody fusion, L4-5.  5. Instrumentation for posterior spinal fusion, intertransverse process arthrodesis, L4-5 using Surgalign MIS percutaneous pedicle screws and rods. 6.   Instrumentation for transforaminal lumbar interbody fusion, L4-5 using Surgalign interbody cage. 7.   Aspiration of bone marrow, L4 and L5 vertebral bodies. 8.   Usage of morselized autograft through same fascial incision. 9.   Usage of demineralized bone matrix allograft putty. 10.   Intraoperative usage of C-arm fluoroscopy image intensification. 11.   Intraoperative usage of SSEP/EMG monitoring with pedicle screw stimulation. CO-SURGEONS:  Dinesh Carrero M.D. and Mary Alice Lockwood M.D.     [facility]  [facility], [facility]. ANESTHESIA:  General anesthesia. ESTIMATED BLOOD LOSS:  50 mL. INTRAVENOUS FLUIDS:  800 mL of crystalloid. SPECIMENS:  L4-5 disc. COMPLICATIONS:  None. SSEP/EMG is without changes throughout the entirety of decompression and fusion with instrumentation. All pedicle screws stimulated with all results greater than 10 milliamps.     INDICATIONS:  The patient is a 58-year-old male who sustained a work-related injury to his lumbar spine on 01/24/2022. He had radiculopathy primarily down the right leg, but also some in his left buttock and thigh. He was worked up with an MRI scan that showed spondylolisthesis and lateral recess stenosis. He did undergo extensive conservative management including medications, physical therapy, injections. He did elect for not only decompression but also stabilization with instrumentation due to his spondylolisthesis. He did understand the risks, complications, and alternatives to the procedure. Risks discussed included bleeding, infection, scarring, injury to nerves and blood vessels, discitis, CSF leakage, pseudoarthrosis, failure of instrumentation, continued and/or worsening pain in the lumbar spine and/or lower extremities, continued and/or worsening numbness, weakness in the extremities, paraparesis, paraplegia, bowel and bladder dysfunction, sexual dysfunction, vision loss, as well as the general risks of anesthetics and surgical procedures in general, namely cardiac, pulmonary, embolic, stroke, ileus, as well as remote possibility of death. He understood that no promises and/or guarantees with regard to the performance of any surgical procedure can or has been given. He understands that desired results may not be achieved, and all of his questions were answered. He was deemed medically stable by the primary care physician as well as the cardiology team prior to surgery. OPERATIVE TECHNIQUE:  The patient was brought to the operating room, was given preoperative IV antibiotics, and given general anesthesia without any difficulty. He had SCDs as well as SSEP/EMG monitoring applied. He was transferred to the prone position on the operating room table and padded appropriately. The lower lumbar spine was then prepped and draped in the usual sterile fashion. Bilateral paraspinal vertical incisions were made extending from the L4 pedicle down to the L5 pedicle.   Kristine Ray retractors were used in the subcutaneous tissue. Bilateral fascial incisions were made in line with the skin incision. The first portion of the procedure was percutaneous pedicle screw insertion on the left at L4-5. The Jamshidi needles were used to enter the posterior pedicles of L4 and L5 on the left. The guidewire was then placed through the Jamshidi needles into the vertebral bodies of L4 and L5. This was visualized on both AP and lateral image intensification views. At this point, bone marrow aspirate was then taken with an empty syringe through the Jamshidi needles. This was performed at both L4 and L5 on the left. This bone marrow aspirate was then placed on the side and mixed with the bone graft for fusion later in the case. The Jamshidi needles were then removed at L4 and L5, leaving the guidewires in place. A 6.5 mm tap was then used to tap the pedicles of L4 and L5 on the left. The guidewire was then used to ensure that there was no medial cortical penetration. A 7.5 x 45 mm Surgalign pedicle screw was then inserted over the guidewire into the pedicle of L4 on the left. A 7.5 x 40 mm Surgalign pedicle screw was then inserted over the guidewire into the pedicle of L5 on the left. The guidewires were then removed. There were no SSEP or EMG changes noted during percutaneous pedicle screw placement. The left L4 and L5 screws were then stimulated with all results greater than 10 milliamps. Next, the right-sided pedicles of L4 and L5 were then addressed. The Jamshidi needles were used in order to penetrate the posterior pedicles of L4 and L5 on the right. This was visualized in both AP and lateral image intensification views. After insertion of the Jamshidi needles, the guidewires were then fed through the sheaths. The Jamshidi needles were then removed. A 6.5 mm tap was then used to tap the pedicles of L4 and L5 on the right.   There were no SSEP or EMG changes noted during tapping of the pedicles. The guidewires were then used to ensure that there was no medial cortical penetration. This was performed under AP and lateral image intensification views. The guidewires were then placed to the side and held with a hemostat forceps for usage later in the case. Next, decompressive laminectomy and Boggs-Gonzalez osteotomy was then performed. It should be noted that Dr. Aime Patterson and I acted as co-surgeons throughout the entirety of decompression and stabilization with instrumentation. Shun Silverio [facility], was first assistant. He assisted in instrument placement, visualization of the nerve root, morselization of the bone graft, and decreased the patient's operative time. His assistance was a medical necessity. Self-retaining retractors were then placed in the wounds. The L4-5 facet on the right was then in direct visualization. The pars interarticularis was then identified. A small osteotome was then used to take down the pars interarticularis on the right. The facet at L4-5 on the right was taken down using Kerrison rongeurs. This was performed in Smith-Gonzalez osteotomy fashion. The facet was removed. This autograft was then morselized by [facility], and mixed with demineralized bone matrix allograft putty as well as the bone marrow aspirate. This bone graft was then placed on the side for spinal fusion later in the case. There was significant lateral recess stenosis due to thickening of the ligament as well as medial facet hypertrophy. The thickened ligament was taken down using Kerrison rongeurs. Also, complete facetectomy was performed on the right in Smith-Gonzalez osteotomy fashion to adequately decompress the nerve root and provide access for insertion of the interbody cage. [address] was then used to explore the lateral recess. Excellent decompression of the L4 and L5 nerve roots was noted after decompression and removal of the facet.   There were no SSEP or EMG changes noted during decompression. Next, discectomy and interbody fusion with instrumentation was performed. At L4-5, the L5 nerve root was gently retracted toward midline. A #15 blade was then used to perform an annulotomy. Pituitary rongeurs were then used to remove the disc material.  Shaving was then progressed from a 7 mm shaver up to 12 mm shaver. A 12 x 26 mm Surgalign interbody cage was then packed with the bone graft and impacted in the L4-5 interspace in transforaminal lumbar interbody fashion from the right. Image intensification views looked very good. There were no SSEP or EMG changes noted. Next, pedicle screw instrumentation for posterolateral fusion was then performed on the right. A 7.5 x 45 mm pedicle screw was then placed over the L4 guidewire. The screw was then inserted without any difficulty. A 7.5 x 40 mm pedicle screw was then placed over the right L5 guidewire. The screw was then inserted without any difficulty. Image intensification views looked very good. There were no SSEP or EMG changes noted. At this point, the pedicle screws of L4 and L5 on the right were stimulated with all results greater than 10 milliamps. Next, decortication of the transverse processes of L4 and L5 was then performed using a double-action rongeur. Two 40 mm rods were then placed in the polyaxial heads of the pedicle screws. The L4 set screws were then tightened and torqued. Slight compression was then carried out across L4-5 with sequential tightening and torquing of the L5 set screw. This was performed bilaterally. There were no SSEP or EMG changes noted during compression. Final intraoperative AP and lateral image intensification view showed appropriate pedicle screw placement, cage placement, and good spinal alignment. Next, copious irrigation using 0.9 normal saline was then used to irrigate out the wounds.   There was no evidence of any CSF leakage after multiple Valsalva maneuvers. The remainder of the combination allograft and autograft was then impacted in the intertransverse process gutters bilaterally for posterolateral fusion across L4-5 after decortication. The fascia was then reapproximated in the usual fashion using an Ethibond suture. The skin also was closed in the usual fashion, first layer subcutaneous and then the skin. Xeroform dressings, 4x4's and Tegaderm were then applied to the wounds. The drapes were then removed and the patient was then transferred to the supine position on the cart. He was extubated without any complications, moving all 4 extremities at the end of the case, and transferred to recovery room in stable condition. All sponge, needle, and instrument counts were appropriate and correct.     Dictated By Jose Yeung MD  d: 10/27/2022 10:59:47  t: 10/27/2022 11:45:32  Job [id number]  KMK/    cc: Jose Yeung MD

## 2022-10-28 VITALS
SYSTOLIC BLOOD PRESSURE: 117 MMHG | BODY MASS INDEX: 36.82 KG/M2 | HEART RATE: 74 BPM | HEIGHT: 65 IN | WEIGHT: 221 LBS | DIASTOLIC BLOOD PRESSURE: 77 MMHG | TEMPERATURE: 98 F | RESPIRATION RATE: 17 BRPM | OXYGEN SATURATION: 94 %

## 2022-10-28 LAB
HCT VFR BLD AUTO: 41.1 %
HGB BLD-MCNC: 13.8 G/DL

## 2022-10-28 PROCEDURE — 99232 SBSQ HOSP IP/OBS MODERATE 35: CPT | Performed by: HOSPITALIST

## 2022-10-28 NOTE — PLAN OF CARE
Patient is ambulating 1 assist w/walker. Has prevena wound vac in place to mid back in place and functioning properly. Has ice machine at the bedside. Pain is being controlled w/ oxy. Is tolerating diet no nausea or vomiting. Is voiding using urinal/ambulating to the bathroom. TEDs and SCDs in place for dvt prophylaxis. DC plan pending. Problem: Patient Centered Care  Goal: Patient preferences are identified and integrated in the patient's plan of care  Description: Interventions:  - What would you like us to know as we care for you?  I only speak Bulgarian   - Provide timely, complete, and accurate information to patient/family  - Incorporate patient and family knowledge, values, beliefs, and cultural backgrounds into the planning and delivery of care  - Encourage patient/family to participate in care and decision-making at the level they choose  - Honor patient and family perspectives and choices    10/27/2022 1816 by Clement Culp  Outcome: Progressing     Problem: Patient/Family Goals  Goal: Patient/Family Long Term Goal  Description: Patient's Long Term Goal: To be able to ambulate w/ minimal pain    Interventions:  - pain medications  - walker  - ice machine   - See additional Care Plan goals for specific interventions    10/27/2022 1816 by Clement Culp  Outcome: Progressing  Goal: Patient/Family Short Term Goal  Description: Patient's Short Term Goal: to go home     Interventions:   - follow plan of care  - See additional Care Plan goals for specific interventions    10/27/2022 1816 by Clement Culp  Outcome: Progressing     Problem: PAIN - ADULT  Goal: Verbalizes/displays adequate comfort level or patient's stated pain goal  Description: INTERVENTIONS:  - Encourage pt to monitor pain and request assistance  - Assess pain using appropriate pain scale  - Administer analgesics based on type and severity of pain and evaluate response  - Implement non-pharmacological measures as appropriate and evaluate response  - Consider cultural and social influences on pain and pain management  - Manage/alleviate anxiety  - Utilize distraction and/or relaxation techniques  - Monitor for opioid side effects  - Notify MD/LIP if interventions unsuccessful or patient reports new pain  - Anticipate increased pain with activity and pre-medicate as appropriate    10/27/2022 1816 by Maurilio Killian  Outcome: Progressing     Problem: RISK FOR INFECTION - ADULT  Goal: Absence of fever/infection during anticipated neutropenic period  Description: INTERVENTIONS  - Monitor WBC  - Administer growth factors as ordered  - Implement neutropenic guidelines    10/27/2022 1816 by Maurilio Killian  Outcome: Progressing     Problem: SAFETY ADULT - FALL  Goal: Free from fall injury  Description: INTERVENTIONS:  - Assess pt frequently for physical needs  - Identify cognitive and physical deficits and behaviors that affect risk of falls.   - [place] fall precautions as indicated by assessment.  - Educate pt/family on patient safety including physical limitations  - Instruct pt to call for assistance with activity based on assessment  - Modify environment to reduce risk of injury  - Provide assistive devices as appropriate  - Consider OT/PT consult to assist with strengthening/mobility  - Encourage toileting schedule    10/27/2022 1816 by Maurilio Killian  Outcome: Progressing     Problem: DISCHARGE PLANNING  Goal: Discharge to home or other facility with appropriate resources  Description: INTERVENTIONS:  - Identify barriers to discharge w/pt and caregiver  - Include patient/family/discharge partner in discharge planning  - Arrange for needed discharge resources and transportation as appropriate  - Identify discharge learning needs (meds, wound care, etc)  - Arrange for interpreters to assist at discharge as needed  - Consider post-discharge preferences of patient/family/discharge partner  - Complete POLST form as appropriate  - Assess patient's ability to be responsible for managing their own health  - Refer to Case Management Department for coordinating discharge planning if the patient needs post-hospital services based on physician/LIP order or complex needs related to functional status, cognitive ability or social support system  10/27/2022 1816 by Stacey Mckinnon  Outcome: Progressing

## 2022-10-28 NOTE — PLAN OF CARE
Patient is Aox4 on RA. [place] speaking. Remote tele no calls. 1 assist with a walker voiding per urinal/up to bathroom. Prevena wound vac to mid/lower back. Oxycodone given for pain management. SCDs and TEDs for DVT prophylaxis. Plan pending. Problem: Patient Centered Care  Goal: Patient preferences are identified and integrated in the patient's plan of care  Description: Interventions:  - What would you like us to know as we care for you?  I am Samoan speaking  - Provide timely, complete, and accurate information to patient/family  - Incorporate patient and family knowledge, values, beliefs, and cultural backgrounds into the planning and delivery of care  - Encourage patient/family to participate in care and decision-making at the level they choose  - Honor patient and family perspectives and choices  10/28/2022 0330 by Belen Dukes RN  Outcome: Progressing  10/28/2022 0204 by Belen Dukes RN  Outcome: Progressing     Problem: Patient/Family Goals  Goal: Patient/Family Long Term Goal  Description: Patient's Long Term Goal: Go home    Interventions:  - pain management  - See additional Care Plan goals for specific interventions  10/28/2022 0330 by Belen Dukes RN  Outcome: Progressing  10/28/2022 0204 by Belen Dukes RN  Outcome: Progressing  Goal: Patient/Family Short Term Goal  Description: Patient's Short Term Goal: ambulate TID    Interventions:   - PT/OT, pain management  - See additional Care Plan goals for specific interventions  10/28/2022 0330 by Belen Dukes RN  Outcome: Progressing  10/28/2022 0204 by Belen Dukes RN  Outcome: Progressing     Problem: PAIN - ADULT  Goal: Verbalizes/displays adequate comfort level or patient's stated pain goal  Description: INTERVENTIONS:  - Encourage pt to monitor pain and request assistance  - Assess pain using appropriate pain scale  - Administer analgesics based on type and severity of pain and evaluate response  - Implement non-pharmacological measures as appropriate and evaluate response  - Consider cultural and social influences on pain and pain management  - Manage/alleviate anxiety  - Utilize distraction and/or relaxation techniques  - Monitor for opioid side effects  - Notify MD/LIP if interventions unsuccessful or patient reports new pain  - Anticipate increased pain with activity and pre-medicate as appropriate  10/28/2022 0330 by Woody Longoria RN  Outcome: Progressing  10/28/2022 0204 by Woody Longoria RN  Outcome: Progressing     Problem: RISK FOR INFECTION - ADULT  Goal: Absence of fever/infection during anticipated neutropenic period  Description: INTERVENTIONS  - Monitor WBC  - Administer growth factors as ordered  - Implement neutropenic guidelines  10/28/2022 0330 by Woody Longoria RN  Outcome: Progressing  10/28/2022 0204 by Woody Longoria RN  Outcome: Progressing     Problem: SAFETY ADULT - FALL  Goal: Free from fall injury  Description: INTERVENTIONS:  - Assess pt frequently for physical needs  - Identify cognitive and physical deficits and behaviors that affect risk of falls.   - [place] fall precautions as indicated by assessment.  - Educate pt/family on patient safety including physical limitations  - Instruct pt to call for assistance with activity based on assessment  - Modify environment to reduce risk of injury  - Provide assistive devices as appropriate  - Consider OT/PT consult to assist with strengthening/mobility  - Encourage toileting schedule  10/28/2022 0330 by Woody Longoria RN  Outcome: Progressing  10/28/2022 0204 by Woody Longoria RN  Outcome: Progressing     Problem: DISCHARGE PLANNING  Goal: Discharge to home or other facility with appropriate resources  Description: INTERVENTIONS:  - Identify barriers to discharge w/pt and caregiver  - Include patient/family/discharge partner in discharge planning  - Arrange for needed discharge resources and transportation as appropriate  - Identify discharge learning needs (meds, wound care, etc)  - Arrange for interpreters to assist at discharge as needed  - Consider post-discharge preferences of patient/family/discharge partner  - Complete POLST form as appropriate  - Assess patient's ability to be responsible for managing their own health  - Refer to Case Management Department for coordinating discharge planning if the patient needs post-hospital services based on physician/LIP order or complex needs related to functional status, cognitive ability or social support system  10/28/2022 0330 by Margret Huffman RN  Outcome: Progressing  10/28/2022 0204 by Margret Huffman RN  Outcome: Progressing

## 2022-10-28 NOTE — PLAN OF CARE
Patient is ambulating 1 assist w/walker. Has prevena wound vac in place to mid back in place and functioning properly. Will be removed and dry dressing changed applied prior to dc. Has ice machine at the bedside. Pain is being controlled w/ oxy. Is tolerating diet no nausea or vomiting. Is voiding using urinal/ambulating to the bathroom. TEDs and SCDs in place for dvt prophylaxis. Plan is for pt to dc home today w/wife. Has been cleared to go, rx and dc instructions given. Per pt and his wife already have post-op medications at home. Problem: Patient Centered Care  Goal: Patient preferences are identified and integrated in the patient's plan of care  Description: Interventions:  - What would you like us to know as we care for you?  I have two kids at home   - Provide timely, complete, and accurate information to patient/family  - Incorporate patient and family knowledge, values, beliefs, and cultural backgrounds into the planning and delivery of care  - Encourage patient/family to participate in care and decision-making at the level they choose  - Honor patient and family perspectives and choices  Outcome: Adequate for Discharge     Problem: Patient/Family Goals  Goal: Patient/Family Long Term Goal  Description: Patient's Long Term Goal: To be able to ambulate w/ minimal pain    Interventions:  - Pain medications  - walker  - ambulation  - ice  - See additional Care Plan goals for specific interventions  Outcome: Adequate for Discharge  Goal: Patient/Family Short Term Goal  Description: Patient's Short Term Goal: to go home     Interventions:   - follow plan of care  - See additional Care Plan goals for specific interventions  Outcome: Adequate for Discharge     Problem: PAIN - ADULT  Goal: Verbalizes/displays adequate comfort level or patient's stated pain goal  Description: INTERVENTIONS:  - Encourage pt to monitor pain and request assistance  - Assess pain using appropriate pain scale  - Administer analgesics based on type and severity of pain and evaluate response  - Implement non-pharmacological measures as appropriate and evaluate response  - Consider cultural and social influences on pain and pain management  - Manage/alleviate anxiety  - Utilize distraction and/or relaxation techniques  - Monitor for opioid side effects  - Notify MD/LIP if interventions unsuccessful or patient reports new pain  - Anticipate increased pain with activity and pre-medicate as appropriate  Outcome: Adequate for Discharge     Problem: RISK FOR INFECTION - ADULT  Goal: Absence of fever/infection during anticipated neutropenic period  Description: INTERVENTIONS  - Monitor WBC  - Administer growth factors as ordered  - Implement neutropenic guidelines  Outcome: Adequate for Discharge     Problem: SAFETY ADULT - FALL  Goal: Free from fall injury  Description: INTERVENTIONS:  - Assess pt frequently for physical needs  - Identify cognitive and physical deficits and behaviors that affect risk of falls.   - [place] fall precautions as indicated by assessment.  - Educate pt/family on patient safety including physical limitations  - Instruct pt to call for assistance with activity based on assessment  - Modify environment to reduce risk of injury  - Provide assistive devices as appropriate  - Consider OT/PT consult to assist with strengthening/mobility  - Encourage toileting schedule  Outcome: Adequate for Discharge     Problem: DISCHARGE PLANNING  Goal: Discharge to home or other facility with appropriate resources  Description: INTERVENTIONS:  - Identify barriers to discharge w/pt and caregiver  - Include patient/family/discharge partner in discharge planning  - Arrange for needed discharge resources and transportation as appropriate  - Identify discharge learning needs (meds, wound care, etc)  - Arrange for interpreters to assist at discharge as needed  - Consider post-discharge preferences of patient/family/discharge partner  - Complete POLST form as appropriate  - Assess patient's ability to be responsible for managing their own health  - Refer to Case Management Department for coordinating discharge planning if the patient needs post-hospital services based on physician/LIP order or complex needs related to functional status, cognitive ability or social support system  Outcome: Adequate for Discharge

## 2022-10-28 NOTE — OPERATIVE REPORT
[facility]    PATIENT'S NAME: Mariano Torres   ATTENDING PHYSICIAN: Dakotah Amaral MD   OPERATING PHYSICIAN: Frances Alcaraz MD   PATIENT ACCOUNT#:   [id number]    LOCATION:  [address] [place] [address] RECORD #:   [id number]       YOB: 1970  ADMISSION DATE:       10/27/2022      OPERATION DATE:  10/27/2022    OPERATIVE REPORT    #####EDITING MAY BE REQUIRED#####    PREOPERATIVE DIAGNOSIS:  L4-5 lumbar spinal stenosis, spondylolisthesis, and lumbar radiculopathy. POSTOPERATIVE DIAGNOSIS:  L4-5 lumbar spinal stenosis, spondylolisthesis, and lumbar radiculopathy. PROCEDURE:  Boggs-Forman osteotomy and decompressive laminectomy on the right side of L4 and L5 vertebral segments, followed by L4-5 posterior spinal instrumented fusion with posterolateral fusion, interbody fusion, pedicle screw instrumentation and interbody cage placement, use of morselized local autograft bone as well as demineralized bone matrix, allograft bone, and separate bone marrow aspiration through separate fascial incision. Decompressive laminectomy was also performed of L4 and L5 vertebral segments for decompression of the neurological elements. Vacuum negative pressure wound closure. CO-SURGEON:  Priscilla Chaney MD.    ASSISTANT:  MARY ANN Giang. Please note 3 sets of hands were required due to the severe complexity and the severe morbid obesity of this patient to help with soft tissue retraction, patient positioning, bone graft preparation, and instrument handling. ANESTHESIA:  General endotracheal anesthesia. BLOOD LOSS:  Minimal, less than 50 mL. COMPLICATIONS:  None. DISPOSITION:  Patient was brought back to the PACU in stable condition. INTRAVENOUS FLUIDS:  800 mL. INDICATIONS:  The patient is a very pleasant gentleman with a history of work-related injury with severe low back pain radiating to the right lower extremity.   Imaging studies demonstrated moderate to severe spinal stenosis with spondylolisthesis, which corresponded to well symptoms at L4-5. Because of persistent symptoms despite a host of nonoperative treatment measures, and because of difficulty with activities of daily living, he opted for surgical intervention. I was asked to act as a co-surgeon to help decrease operative time, operative risk, and operative blood loss. We discussed risks and benefits at length including, but not limited to, risks of infection; bleeding; injury to the surrounding neurovascular and/or visceral structures; durotomy; pseudomeningocele; hematoma; cauda equina syndrome; paralysis; paraplegia; quadriplegia; paraparesis; quadriparesis; neurological injury; chronic pain; chronic disability; worsening or same symptoms; positional complications; blindness; DVT; pulmonary embolism; death; perioperative medical, surgical, cardiac or pulmonary complications; wound healing complications; adjacent level disease; pseudoarthrosis; hardware failure; hardware migration; inadequate decompression; possibly needing more surgery in the future; malposition of hardware; unexpected complications; etc.  List of risks and benefits is meant to be illustrative but not comprehensive of our entire discussion. Because of his multiple medical comorbidities, we felt it was safest to act as co-surgeons in order to help decrease operative time, operative risks, operative blood loss. OPERATIVE TECHNIQUE:  The patient was identified with his low back pain marked preoperatively. He was then brought to our operating theater and placed on a surgical cart in a supine position. Perioperatively, he was given intravenous antibiotics consisting of cefazolin 2 g within 30 minutes prior to incision. Sequential compression device boots were placed on both lower extremities and activated. General endotracheal anesthesia was then induced while maintaining neutral spine precautions.   Spinal monitor and leads were applied. He was carefully transferred into a prone position on the [place] table with the Luis Armando frame. All bony and soft tissue prominences were carefully padded. We ensured there was no significant flexion or extension of the cervical spine. We ensured there was no significant pressure upon the face or the eyes. We ensured the shoulders and elbows were comfortable in a 90/90 position without any significant pressure or traction upon bilateral brachial plexus, cubital tunnels, carpal tunnels, rotator cuff musculature, shoulder girdles. We made sure there was no pressure upon the abdomen or the genitalia and that the feet were hanging freely. Once the patient had been positioned to the satisfaction of myself as well as my co-surgeon, as well as the anesthetic and nursing teams, we proceeded to carry out our surgical time-out where we identified the patient; surgical procedure; surgical level; surgical site; and predominant side of radiculopathy, which was the right side. This was verified by both surgeons as well as the anesthetic and nursing teams. This was compared to the preoperative imaging studies which were present in the operating theater for review, and everything concurred with the operative plan. Lumbar spinal region was prepped and draped in normal sterile fashion. We marked our 2 paramedian incisional markings such that we could utilize the Gal plane so that we could access L4-5 utilizing the sacrum as anatomic cutting marker on later fluoroscopic imaging. Skin incisions were made. Subcutaneous dissection was carried out in a blunt fashion. Hemostasis was achieved with monopolar and bipolar electrocautery. The lumbodorsal fascia was identified and sharply incised bilaterally. I used a Jamshidi needle, used a separate fascial and skin incision to aspirate bone marrow through the L5 transpedicular approach.   This was followed by placement of guidewires and placement of pedicle screws on the left side with a 6.5 mm x 40 mm screw in the left L5 pedicle, a 7.5 mm x 45 mm screw in the left L4 pedicle. On the right side, the guidewires were left in place. We ensured throughout the case that there was no migration of the guidewire, outside of the confines of the vertebral bodies. We then docked a Sanford retractor overlying the L4-5 facet joint and hemilamina. We carefully verified the appropriate level, followed by using a combination of the high-speed drill as well as Kerrison rongeurs to carefully decompress the central thecal sac, lateral recess, and neural foramina region. The facet joint was resected with Smith-Forman osteotomy as well as well as laminectomy at L4 and L5 vertebral segments. The local bone that was harvested was morselized and used for autograft bone grafting as well. The ligamentum flavum was carefully lifted off the thecal sac with careful decompression of the central thecal sac, lateral recess, and neural foraminal decompression. We used Kambin triangle to do the transforaminal approach to perform our thorough discectomy and used serial ely to prepare the disc space. After thorough discectomy, cartilage and endplates were removed and the subchondral bone was exposed. The interbody space was prepared for fusion. The interbody space was packed with morselized local autograft bone and demineralized bone matrix putty within the interbody spaces, which was followed by placement of a cage which consisted of a 26 mm length cage with 12 mm height which was made out of PEEK. At this point, surgical wounds were thoroughly irrigated with copious amounts of normal saline. Valsalva maneuver in a sustained fashion at 40 mmHg showed no evidence of spinal fluid leak, bleeding, nor any _______ complications. We ensured there were no sharp bony edges at the areas of decompression. Meticulous hemostasis was achieved. We placed the right-sided screws.   EMG stimulation of all screws showed no response below the warning threshold. Rods were placed, and screws were final tightened and torqued. We ensured there was no impingement upon the L3-4 facet joints by any of the surgical hardware. At the end of the case, all sponge, needle, and instrument counts were correct. Dr. Shar Castrejon proceeded with a multilayered closure, and a negative pressure dressing was applied. There were no complications.     Dictated By Ayleen Moore MD  d: 10/27/2022 14:52:01  t: 10/27/2022 21:06:47  [facility] [id number]  IB/    cc: Ayleen Moore MD

## (undated) DEVICE — SUT ETHIBOND 1 CT-1 X425H

## (undated) DEVICE — Device

## (undated) DEVICE — TRAY SURESTEP 16 BARDEX DRAIN

## (undated) DEVICE — 3M(TM) TEGADERM(TM) TRANSPARENT FILM DRESSING FRAME STYLE 1628: Brand: 3M™ TEGADERM™

## (undated) DEVICE — UNDYED BRAIDED (POLYGLACTIN 910), SYNTHETIC ABSORBABLE SUTURE: Brand: COATED VICRYL

## (undated) DEVICE — CS5/5+ FASTPACK, 125ML, 150µ RES: Brand: HAEMONETICS CELL SAVER 5/5+ SYSTEMS

## (undated) DEVICE — SUT VICRYL 0 CT-1 J470D

## (undated) DEVICE — ENCORE® LATEX MICRO SIZE 8, STERILE LATEX POWDER-FREE SURGICAL GLOVE: Brand: ENCORE

## (undated) DEVICE — DRAPE SHEET LARGE 76X55

## (undated) DEVICE — OCCLUSIVE GAUZE STRIP,3% BISMUTH TRIBROMOPHENATE IN PETROLATUM BLEND: Brand: XEROFORM

## (undated) DEVICE — SPONGE PREMIERPRO 7X18X18

## (undated) DEVICE — FORCEP CUSH BAY BP DISP 7.5IN

## (undated) DEVICE — LAMINECTOMY: Brand: MEDLINE INDUSTRIES, INC.

## (undated) DEVICE — TRAP MCS 40ML 5IN PLS SCR CAP

## (undated) DEVICE — C-ARM: Brand: UNBRANDED

## (undated) DEVICE — TOWEL SURG OR 17X30IN BLUE

## (undated) DEVICE — DRAPE SRG 90X60IN BCK TBL CVR

## (undated) DEVICE — COVER SLV UNV DISP NTR STRL LF

## (undated) DEVICE — SOLUTION  .9 1000ML BTL

## (undated) DEVICE — PROXIMATE SKIN STAPLERS (35 WIDE) CONTAINS 35 STAINLESS STEEL STAPLES (FIXED HEAD): Brand: PROXIMATE

## (undated) DEVICE — GOWN SURG AERO BLUE PERF XLG

## (undated) DEVICE — CONTAINER GENT-L-KARE 4OZ GRAY

## (undated) DEVICE — SYRINGE BULB 50/CS 48/PLT: Brand: MEDEGEN MEDICAL PRODUCTS, LLC

## (undated) DEVICE — PREVENA PEEL & PLACE SYSTEM KIT- 13 CM: Brand: PREVENA™ PEEL & PLACE™

## (undated) DEVICE — FLOSEAL HEMOSTATIC MATRIX, 5ML: Brand: FLOSEAL HEMOSTATIC MATRIX